# Patient Record
(demographics unavailable — no encounter records)

---

## 2024-10-21 NOTE — HISTORY OF PRESENT ILLNESS
[TextBox_4] : PT HERE TODAY C/O BLADDER PAIN, FREQUENT URINATION AND PRESSURE THAT BEGAN ON FRIDAY LMP: 2010 [LMPDate] : 2010 [TextBox_6] : 2010 [FreeTextEntry1] : 2010

## 2024-11-12 NOTE — CARDIOLOGY SUMMARY
[de-identified] : 11 12 2024:  [de-identified] : mar 2024:  LDL : 148.  HDL: 91.  Total: 251.  Tgs: 77

## 2024-11-12 NOTE — HISTORY OF PRESENT ILLNESS
[FreeTextEntry1] : elevated blood pressure without diagnosis of hypertension  and coronary artery disease evaluation   This is a 64 year old woman with  history of dyslipidemia , here for coronary artery disease evaluation and preventive visit denies any chest pain . and no dyspnea on exertion ,  no dizziness.  she  saw a cardiologist 2 years ago.  and she wore a monitor watch 2 years ago and her heart was beating fast one night.  and 1 month later it recurred and she saw a cardiologist 2 years ag o./  she got some stress test adn transthoracic echocardiogram . and it was fine.   she is taking fish oil   No smoking. 2 glassess of red wine every other day - alcohol. No drugs.     Family history:  Father:   + myocardial infarction. at 56 years;   no Cerebro vascular accident  Mother :  no myocardial infarction. No cerebro vascular accident  ; pancreatic cancer  Siblings:  No myocardial infarction.  No CVA  Lung cancer - smoker.    Brtoher - COPD .  smoker

## 2024-11-12 NOTE — DISCUSSION/SUMMARY
[Patient] : the patient [Risks] : risks [With Me] : with me [FreeTextEntry1] : This is a 64 year old woman with  history of dyslipidemia , here for coronary artery disease evaluation and preventive visit   1) elevated blood pressure without diagnosis of hypertension : diet and exercise.  24 hr BP monitor.  alcohol restriciton . salt restriction  2) dyslipidemia :  Lipoprotein a check.  3) coronary artery disease screening : CT calcium score.  cardiac crp.  4) snoring: : home sleep study  [EKG obtained to assist in diagnosis and management of assessed problem(s)] : EKG obtained to assist in diagnosis and management of assessed problem(s)

## 2025-06-02 NOTE — HISTORY OF PRESENT ILLNESS
[N] : Patient does not use contraception [Y] : Positive pregnancy history [Currently Active] : currently active [Men] : men [Vaginal] : vaginal [No] : No [TextBox_4] : PT HERE FOR ANNUAL EXAM LMP: 2010 [Mammogramdate] : 5/28/24 [TextBox_19] : BR1 [PapSmeardate] : 5/13/24 [TextBox_31] : WNL [BoneDensityDate] : 7/10/23 [TextBox_37] : MILD OSTEOPENIA [ColonoscopyDate] : never [LMPDate] : 2010 [PGHxTotal] : 2 [[facility] : [facility] [[facility] Center[facility]] : 1 [PGHxABSpont] : 1 [TextBox_6] : 2010 [TextBox_9] : 11 [FreeTextEntry1] : 2010

## 2025-06-02 NOTE — PHYSICAL EXAM
[MA] : MA [Chaperoned Physical Exam] : A chaperone was present in the examining room during all aspects of the physical examination. [Appropriately responsive] : appropriately responsive [Alert] : alert [No Acute Distress] : no acute distress [No Lymphadenopathy] : no lymphadenopathy [Regular Rate Rhythm] : regular rate rhythm [No Murmurs] : no murmurs [Clear to Auscultation B/L] : clear to auscultation bilaterally [Soft] : soft [Non-tender] : non-tender [Non-distended] : non-distended [No HSM] : No HSM [No Mass] : no mass [Oriented x3] : oriented x3 [Examination Of The Breasts] : a normal appearance [Breast Palpation Diffuse Fibrous Tissue Bilateral] : fibrocystic changes [No Masses] : no breast masses were palpable [No Lesions] : no lesions  [Labia Majora] : normal [Labia Minora] : normal [Atrophy] : atrophy [Dry Mucosa] : dry mucosa [No Bleeding] : There was no active vaginal bleeding [Normal] : normal [Anteversion] : anteverted [Uterine Adnexae] : normal [Declined] : Patient declined rectal exam [FreeTextEntry2] : SOHAIL KEYS [FreeTextEntry6] : Symmetrical, no masses nontender [FreeTextEntry8] : No masses nontender bilateral

## 2025-06-02 NOTE — PLAN
[FreeTextEntry1] : Pap completed patient will have mammogram and bilateral breast sonogram she will use over-the-counter Replens for vaginal dryness dyspareunia continue vitamin D3 with multivitamin stressed the importance of colonoscopy and has been given a referral to Dr. Pratima holguin in 12 months

## 2025-06-23 NOTE — CONSULT LETTER
[Dear  ___] : Dear  [unfilled], [Courtesy Letter:] : I had the pleasure of seeing your patient, [unfilled], in my office today. [Please see my note below.] : Please see my note below. [Sincerely,] : Sincerely, [FreeTextEntry2] : Dr. Lorenzo Mcguire [FreeTextEntry3] : Enrique Wadsworth MD Otolaryngology at [address], [address] [place] [address] Phone: [phone] Fax: [phone]

## 2025-06-23 NOTE — HISTORY OF PRESENT ILLNESS
[de-identified] : 65 year old female here for clogged right ear sensation and hearing loss for 1 week. Pt states last week she was having bad allergy symptoms including dysphonia and throat irritation, blood tinged anterior rhinorrhea, frequent throat clearing, productive yellow mucus, dyspnea and poor sleep quality. Pt states she started herself on amoxicillin with some relief. Denies otorrhea, ear infections, tinnitus, dizziness, vertigo

## 2025-06-23 NOTE — PHYSICAL EXAM
[FreeTextEntry8] : wax impaction removed with curette without complications.  Anterior canal slightly red and swollen  [de-identified] : drum dull but mobile to pneumatic otoscopy [de-identified] : mild congestion present [Normal] : mucosa is normal [Midline] : trachea located in midline position

## 2025-06-23 NOTE — PROCEDURE
[de-identified] : Laryngoscopy: Verbal consent obtained. Technique: examined with the flexible endoscope. Nasopharynx Findings: the nasopharynx was normal without masses or lesions Adenoids Findings: normal. Eustachian Tube Ostium Findings: normal. Choanae Findings: normal. Posterior Nasopharynx Findings: normal. Hypopharynx Findings: normal, normal pharyngeal walls, normal base of the tongue, normal pyriform sinuses without saliva pooling and normal vallecula. Epiglottis Findings: the epiglottis was regular without inflammation, lesions or masses, with regular aryepiglottic folds, and a smooth petiolus. Glottis Findings: the false vocal folds were pink and regular, the ventricular sulcus was open, the true vocal folds were glistening white, tense and of equal length, mobility, and height. TVC Findings: no true vocal cord paralysis, mild true vocal cord edema and no true vocal cord nodules. Arytenoid Findings: no arytenoid ulcerations, no arytenoid granulomas and no arytenoid erythema. Posterior Cricoid Findings: interarytenoid and postcricoid area normal.   Patient tolerated procedure well.

## 2025-06-23 NOTE — ASSESSMENT
[FreeTextEntry1] : Finishing course of Augmentin.  Hoarseness improved but still present.  Exam with edema consistent with laryngitis.  Concern for bronchitis/lung issue.  Getting CXR ordered by PCP.  Recommend pulmonary consultation.  Consider inhaler as needed.  Start Flonase and OTC Nasal saline rinse kit. Seek attention for otalgia, otorrhea, bleeding, headache, hearing loss, dizziness or vertigo. Followup 1 month